# Patient Record
Sex: MALE | Race: AMERICAN INDIAN OR ALASKA NATIVE | NOT HISPANIC OR LATINO
[De-identification: names, ages, dates, MRNs, and addresses within clinical notes are randomized per-mention and may not be internally consistent; named-entity substitution may affect disease eponyms.]

---

## 2017-02-24 PROBLEM — Z00.00 ENCOUNTER FOR PREVENTIVE HEALTH EXAMINATION: Status: ACTIVE | Noted: 2017-02-24

## 2017-03-09 ENCOUNTER — APPOINTMENT (OUTPATIENT)
Dept: PLASTIC SURGERY | Facility: CLINIC | Age: 22
End: 2017-03-09

## 2017-05-04 ENCOUNTER — EMERGENCY (EMERGENCY)
Facility: HOSPITAL | Age: 22
LOS: 1 days | Discharge: HOME | End: 2017-05-04
Attending: EMERGENCY MEDICINE | Admitting: EMERGENCY MEDICINE
Payer: COMMERCIAL

## 2017-05-04 VITALS
SYSTOLIC BLOOD PRESSURE: 137 MMHG | TEMPERATURE: 98 F | DIASTOLIC BLOOD PRESSURE: 80 MMHG | HEART RATE: 72 BPM | RESPIRATION RATE: 15 BRPM | OXYGEN SATURATION: 100 %

## 2017-05-04 PROCEDURE — 99285 EMERGENCY DEPT VISIT HI MDM: CPT | Mod: 25

## 2017-05-04 NOTE — ED ADULT TRIAGE NOTE - CHIEF COMPLAINT QUOTE
Pt c/o L testicular pain starting Monday.  Pt is unsure if there is any swelling.  Denies any penile discharge or urinary symptoms.  Appears uncomfortable.  Denies any PMHx.

## 2017-05-05 VITALS
DIASTOLIC BLOOD PRESSURE: 90 MMHG | OXYGEN SATURATION: 99 % | HEART RATE: 79 BPM | SYSTOLIC BLOOD PRESSURE: 140 MMHG | RESPIRATION RATE: 18 BRPM

## 2017-05-05 LAB
APPEARANCE UR: CLEAR — SIGNIFICANT CHANGE UP
BILIRUB UR-MCNC: NEGATIVE — SIGNIFICANT CHANGE UP
BLOOD UR QL VISUAL: NEGATIVE — SIGNIFICANT CHANGE UP
COLOR SPEC: YELLOW — SIGNIFICANT CHANGE UP
GLUCOSE UR-MCNC: NEGATIVE — SIGNIFICANT CHANGE UP
KETONES UR-MCNC: SIGNIFICANT CHANGE UP
LEUKOCYTE ESTERASE UR-ACNC: NEGATIVE — SIGNIFICANT CHANGE UP
MUCOUS THREADS # UR AUTO: SIGNIFICANT CHANGE UP
NITRITE UR-MCNC: NEGATIVE — SIGNIFICANT CHANGE UP
PH UR: 6 — SIGNIFICANT CHANGE UP (ref 4.6–8)
PROT UR-MCNC: 10 — SIGNIFICANT CHANGE UP
RBC CASTS # UR COMP ASSIST: SIGNIFICANT CHANGE UP (ref 0–?)
SP GR SPEC: 1.02 — SIGNIFICANT CHANGE UP (ref 1–1.03)
SQUAMOUS # UR AUTO: SIGNIFICANT CHANGE UP
UROBILINOGEN FLD QL: NORMAL E.U. — SIGNIFICANT CHANGE UP (ref 0.1–0.2)
WBC UR QL: SIGNIFICANT CHANGE UP (ref 0–?)

## 2017-05-05 PROCEDURE — 76870 US EXAM SCROTUM: CPT | Mod: 26

## 2017-05-05 NOTE — ED PROVIDER NOTE - ATTENDING CONTRIBUTION TO CARE
21M no PMH p/w L testicular pain x4-5d, no other systemic complaints, atraumatic, no STD risk factors. Vitals and exam wnl.  ddx: MSK vs. possible hydrocele/varciocele vs. less likely torsion/epididymitis vs. less likely renal colic or hernia.  UA, US, symptom control, reassess.

## 2017-05-05 NOTE — ED PROVIDER NOTE - CARE PLAN
Principal Discharge DX:	Testicular pain Principal Discharge DX:	Testicular pain  Instructions for follow-up, activity and diet:	- Follow up with your primary care doctor in 1-2 days.    - Bring results with you to the appointment.   - Follow up with urology, see attached list.   - Take tylenol 650mg or motrin 600mg every 6 hours for pain or fever.   - Return to the ED for new or worsening symptoms.

## 2017-05-05 NOTE — ED PROVIDER NOTE - OBJECTIVE STATEMENT
21M no PMH p/w worsening waxing and waning L-sided testicular pain x 5 days, no aggravating/alleviating factors. Saw PMD today, started on meloxicam,  scheduled for US tomorrow. Has never been sexually active. Denies dysuria, hematuria, discharge, n/v/d, abdominal pain, fever, STD history. 21M no PMH p/w worsening waxing and waning L-sided testicular pain x 5 days, no aggravating/alleviating factors. Saw PMD today, started on meloxicam,  scheduled for US tomorrow. Has never been sexually active. Denies dysuria, hematuria, discharge, n/v/d, abdominal pain, fever, STD history, trauma, flank pain.

## 2017-05-05 NOTE — ED ADULT NURSE NOTE - OBJECTIVE STATEMENT
22 yo male received in spot 25. Pt co 7/10 L-sided testicular pain x 5 days.  Pt states he has never been sexually active. Denies dysuria, discharge, n/v/d, abdominal pain, fever, STD history.  Pt states the pain comes and goes.

## 2017-05-05 NOTE — ED PROVIDER NOTE - PLAN OF CARE
- Follow up with your primary care doctor in 1-2 days.    - Bring results with you to the appointment.   - Follow up with urology, see attached list.   - Take tylenol 650mg or motrin 600mg every 6 hours for pain or fever.   - Return to the ED for new or worsening symptoms.

## 2017-05-10 PROBLEM — Z00.00 ENCOUNTER FOR PREVENTIVE HEALTH EXAMINATION: Noted: 2017-05-10

## 2017-05-12 ENCOUNTER — EMERGENCY (EMERGENCY)
Facility: HOSPITAL | Age: 22
LOS: 1 days | Discharge: HOME | End: 2017-05-12
Attending: EMERGENCY MEDICINE | Admitting: EMERGENCY MEDICINE
Payer: COMMERCIAL

## 2017-05-12 VITALS
SYSTOLIC BLOOD PRESSURE: 150 MMHG | DIASTOLIC BLOOD PRESSURE: 75 MMHG | OXYGEN SATURATION: 100 % | HEART RATE: 78 BPM | TEMPERATURE: 98 F | RESPIRATION RATE: 16 BRPM

## 2017-05-12 LAB
C TRACH RRNA SPEC QL NAA+PROBE: SIGNIFICANT CHANGE UP
N GONORRHOEA RRNA SPEC QL NAA+PROBE: SIGNIFICANT CHANGE UP
SPECIMEN SOURCE: SIGNIFICANT CHANGE UP

## 2017-05-12 PROCEDURE — 99283 EMERGENCY DEPT VISIT LOW MDM: CPT | Mod: 25

## 2017-05-12 RX ORDER — ACETAMINOPHEN 500 MG
975 TABLET ORAL ONCE
Qty: 0 | Refills: 0 | Status: COMPLETED | OUTPATIENT
Start: 2017-05-12 | End: 2017-05-12

## 2017-05-12 RX ADMIN — Medication 975 MILLIGRAM(S): at 03:58

## 2017-05-12 NOTE — ED PROVIDER NOTE - OBJECTIVE STATEMENT
21M no PMH p/w waxing and waning L testicular pain x 2 weeks. Was seen in ED last week, US was negative. Has meloxicam at home from PMD. PMD gave him cipro, but pt did not start bec he wanted to see  first. Has not been able to see a  yet. Denies fever, discharge, n/v/d, sexual activity. 21M no PMH p/w waxing and waning L testicular pain x 2 weeks. Was seen in ED last week, US was negative. Has meloxicam at home from PMD. PMD gave him cipro, but pt did not start because he wanted to see  first. Has not been able to see a  yet. Denies fever, discharge, n/v/d, sexual activity.

## 2017-05-12 NOTE — ED PROVIDER NOTE - CARE PLAN
Principal Discharge DX:	Testicular pain, left  Instructions for follow-up, activity and diet:	- Follow up with your primary care doctor in 1-2 days.    - Call [phone] at 8:30AM and make an appointment for Urology clinic later today.   - Bring results with you to the appointment.   - Start taking the cipro.  - Take tylenol 650mg or motrin 600mg every 6 hours for pain or fever.   - Return to the ED for new or worsening symptoms. Principal Discharge DX:	Testicular pain, left  Instructions for follow-up, activity and diet:	- Follow up with your primary care doctor in 1-2 days.    - Call [phone] at 8:30AM and make an appointment for Urology clinic later today.   - Bring results with you to the appointment.   - Start taking the cipro.  - Take tylenol 650mg or motrin 600mg every 6 hours for pain or fever.   - Return to the ED for new or worsening symptoms.

## 2017-05-12 NOTE — ED ADULT TRIAGE NOTE - CHIEF COMPLAINT QUOTE
Pt st" For past 15 days I have testicular pain I was seen here last thursday, discharge instructed to take  Meloxacan but that was no helping so I switched to Motrin...still having pain ."

## 2017-05-12 NOTE — ED PROVIDER NOTE - PLAN OF CARE
- Follow up with your primary care doctor in 1-2 days.    - Call [phone] at 8:30AM and make an appointment for Urology clinic later today.   - Bring results with you to the appointment.   - Start taking the cipro.  - Take tylenol 650mg or motrin 600mg every 6 hours for pain or fever.   - Return to the ED for new or worsening symptoms.

## 2017-05-12 NOTE — ED PROVIDER NOTE - ATTENDING CONTRIBUTION TO CARE
att22 y/o m with no pmh, presents for repeat visit with L testicular pain. Not sexually active. Neg Ua and US on recent visit. Pt saw PCP and was given rx for cipro and instructed to f/u with urology before taking. No flank pain. No hematuria. Well appearing,  exam with Dr Gomez and Dr Canales present. normal lie, normal reflex, no drainage. minimal TTP at L epididmysis. No testicular TTP. Plan for g/c, start cipro, and discussed with uro for close f/u. Presentation not consistent with torsion. tylenol for mild discomfort.   I have personally performed a face to face bedside history and physical examination of this patient. I have discussed the history, examination, review of systems, assessment and plan of management with the resident. I have reviewed the electronic medical record and amended it to reflect my history, review of systems, physical exam, assessment and plan.

## 2017-05-12 NOTE — ED PROVIDER NOTE - PROGRESS NOTE DETAILS
Patricia PGY-2: Spoke to ANGELICA, they have clinic later today, pt to call [phone] at 8:30AM and make an appointment.

## 2017-05-12 NOTE — ED PROVIDER NOTE - GENITOURINARY TESTICULAR EXAM, LEFT
cremasteric reflex present/Chaperone: Dr Canales normal lie, minimal ttp. no edema/erythema/warmth Chaperone: Dr Canales normal lie, minimal ttp L epididmysis. no edema/erythema/warmth/cremasteric reflex present

## 2017-05-17 ENCOUNTER — APPOINTMENT (OUTPATIENT)
Dept: UROLOGY | Facility: CLINIC | Age: 22
End: 2017-05-17

## 2017-06-09 ENCOUNTER — TRANSCRIPTION ENCOUNTER (OUTPATIENT)
Age: 22
End: 2017-06-09

## 2017-06-20 ENCOUNTER — FORM ENCOUNTER (OUTPATIENT)
Age: 22
End: 2017-06-20

## 2017-06-21 ENCOUNTER — OUTPATIENT (OUTPATIENT)
Dept: OUTPATIENT SERVICES | Facility: HOSPITAL | Age: 22
LOS: 1 days | End: 2017-06-21
Payer: COMMERCIAL

## 2017-06-21 ENCOUNTER — APPOINTMENT (OUTPATIENT)
Dept: ULTRASOUND IMAGING | Facility: CLINIC | Age: 22
End: 2017-06-21

## 2017-06-21 DIAGNOSIS — Z00.8 ENCOUNTER FOR OTHER GENERAL EXAMINATION: ICD-10-CM

## 2017-06-21 PROCEDURE — 76775 US EXAM ABDO BACK WALL LIM: CPT

## 2017-09-05 ENCOUNTER — APPOINTMENT (OUTPATIENT)
Dept: UROLOGY | Facility: CLINIC | Age: 22
End: 2017-09-05
Payer: COMMERCIAL

## 2017-09-05 VITALS
DIASTOLIC BLOOD PRESSURE: 75 MMHG | OXYGEN SATURATION: 96 % | WEIGHT: 176 LBS | TEMPERATURE: 98.3 F | BODY MASS INDEX: 23.84 KG/M2 | HEIGHT: 72 IN | HEART RATE: 65 BPM | SYSTOLIC BLOOD PRESSURE: 112 MMHG

## 2017-09-05 DIAGNOSIS — N50.819 TESTICULAR PAIN, UNSPECIFIED: ICD-10-CM

## 2017-09-05 PROCEDURE — 99205 OFFICE O/P NEW HI 60 MIN: CPT

## 2017-09-05 RX ORDER — AMITRIPTYLINE HYDROCHLORIDE 10 MG/1
10 TABLET, FILM COATED ORAL
Qty: 90 | Refills: 4 | Status: ACTIVE | COMMUNITY
Start: 2017-09-05 | End: 1900-01-01

## 2017-09-07 LAB
APPEARANCE: CLEAR
BACTERIA: NEGATIVE
BILIRUBIN URINE: NEGATIVE
BLOOD URINE: NEGATIVE
COLOR: YELLOW
GLUCOSE QUALITATIVE U: NORMAL MG/DL
KETONES URINE: NEGATIVE
LEUKOCYTE ESTERASE URINE: NEGATIVE
MICROSCOPIC-UA: NORMAL
NITRITE URINE: NEGATIVE
PH URINE: 5.5
PROTEIN URINE: NEGATIVE MG/DL
RED BLOOD CELLS URINE: 1 /HPF
SPECIFIC GRAVITY URINE: 1.03
SQUAMOUS EPITHELIAL CELLS: 1 /HPF
UROBILINOGEN URINE: NORMAL MG/DL
WHITE BLOOD CELLS URINE: 0 /HPF

## 2018-01-02 ENCOUNTER — APPOINTMENT (OUTPATIENT)
Dept: UROLOGY | Facility: CLINIC | Age: 23
End: 2018-01-02

## 2019-11-29 NOTE — ED ADULT TRIAGE NOTE - NS ED TRIAGE AVPU SCALE
Continue IS, pain management Alert-The patient is alert, awake and responds to voice. The patient is oriented to time, place, and person. The triage nurse is able to obtain subjective information.

## 2020-06-18 PROBLEM — N50.819 ORCHALGIA: Status: ACTIVE | Noted: 2017-09-05

## 2021-01-08 ENCOUNTER — TRANSCRIPTION ENCOUNTER (OUTPATIENT)
Age: 26
End: 2021-01-08

## 2021-02-04 ENCOUNTER — TRANSCRIPTION ENCOUNTER (OUTPATIENT)
Age: 26
End: 2021-02-04

## 2021-06-01 ENCOUNTER — TRANSCRIPTION ENCOUNTER (OUTPATIENT)
Age: 26
End: 2021-06-01

## 2021-09-22 ENCOUNTER — TRANSCRIPTION ENCOUNTER (OUTPATIENT)
Age: 26
End: 2021-09-22

## 2021-12-10 ENCOUNTER — TRANSCRIPTION ENCOUNTER (OUTPATIENT)
Age: 26
End: 2021-12-10

## 2021-12-18 ENCOUNTER — TRANSCRIPTION ENCOUNTER (OUTPATIENT)
Age: 26
End: 2021-12-18

## 2021-12-31 ENCOUNTER — TRANSCRIPTION ENCOUNTER (OUTPATIENT)
Age: 26
End: 2021-12-31

## 2022-03-16 ENCOUNTER — TRANSCRIPTION ENCOUNTER (OUTPATIENT)
Age: 27
End: 2022-03-16

## 2023-03-20 ENCOUNTER — APPOINTMENT (OUTPATIENT)
Dept: ULTRASOUND IMAGING | Facility: CLINIC | Age: 28
End: 2023-03-20
Payer: MEDICAID

## 2023-03-20 ENCOUNTER — OUTPATIENT (OUTPATIENT)
Dept: OUTPATIENT SERVICES | Facility: HOSPITAL | Age: 28
LOS: 1 days | End: 2023-03-20
Payer: MEDICAID

## 2023-03-20 DIAGNOSIS — Z00.8 ENCOUNTER FOR OTHER GENERAL EXAMINATION: ICD-10-CM

## 2023-03-20 PROCEDURE — 76856 US EXAM PELVIC COMPLETE: CPT | Mod: 26

## 2023-03-20 PROCEDURE — 76856 US EXAM PELVIC COMPLETE: CPT

## 2023-03-20 PROCEDURE — 76700 US EXAM ABDOM COMPLETE: CPT

## 2023-03-20 PROCEDURE — 76700 US EXAM ABDOM COMPLETE: CPT | Mod: 26

## 2023-05-15 ENCOUNTER — APPOINTMENT (OUTPATIENT)
Dept: GASTROENTEROLOGY | Facility: CLINIC | Age: 28
End: 2023-05-15
